# Patient Record
Sex: FEMALE | Race: WHITE | NOT HISPANIC OR LATINO | URBAN - METROPOLITAN AREA
[De-identification: names, ages, dates, MRNs, and addresses within clinical notes are randomized per-mention and may not be internally consistent; named-entity substitution may affect disease eponyms.]

---

## 2017-07-26 ENCOUNTER — COMMUNICATION - HEALTHEAST (OUTPATIENT)
Dept: BEHAVIORAL HEALTH | Facility: CLINIC | Age: 67
End: 2017-07-26

## 2017-07-26 DIAGNOSIS — F33.3 MAJOR DEPRESSIVE DISORDER, RECURRENT, SEVERE WITH PSYCHOTIC FEATURES (H): ICD-10-CM

## 2017-07-26 DIAGNOSIS — F42.9 OCD (OBSESSIVE COMPULSIVE DISORDER): ICD-10-CM

## 2017-07-26 DIAGNOSIS — G47.00 INSOMNIA: ICD-10-CM

## 2017-09-26 ENCOUNTER — AMBULATORY - HEALTHEAST (OUTPATIENT)
Dept: LAB | Facility: CLINIC | Age: 67
End: 2017-09-26

## 2017-09-26 ENCOUNTER — OFFICE VISIT - HEALTHEAST (OUTPATIENT)
Dept: BEHAVIORAL HEALTH | Facility: CLINIC | Age: 67
End: 2017-09-26

## 2017-09-26 DIAGNOSIS — G47.00 INSOMNIA: ICD-10-CM

## 2017-09-26 DIAGNOSIS — F09 COGNITIVE DISORDER: ICD-10-CM

## 2017-09-26 DIAGNOSIS — Z02.9 ENCOUNTERS FOR ADMINISTRATIVE PURPOSE: ICD-10-CM

## 2017-09-26 DIAGNOSIS — Z79.899 HIGH RISK MEDICATION USE: ICD-10-CM

## 2017-09-26 DIAGNOSIS — F42.9 OCD (OBSESSIVE COMPULSIVE DISORDER): ICD-10-CM

## 2017-09-26 DIAGNOSIS — R53.81 PHYSICAL DECONDITIONING: ICD-10-CM

## 2017-09-26 DIAGNOSIS — F45.9 SOMATOFORM DISORDER: ICD-10-CM

## 2017-09-26 ASSESSMENT — MIFFLIN-ST. JEOR: SCORE: 1087.88

## 2018-01-10 ENCOUNTER — COMMUNICATION - HEALTHEAST (OUTPATIENT)
Dept: BEHAVIORAL HEALTH | Facility: CLINIC | Age: 68
End: 2018-01-10

## 2018-03-13 ENCOUNTER — OFFICE VISIT - HEALTHEAST (OUTPATIENT)
Dept: BEHAVIORAL HEALTH | Facility: CLINIC | Age: 68
End: 2018-03-13

## 2018-03-13 DIAGNOSIS — R53.81 PHYSICAL DECONDITIONING: ICD-10-CM

## 2018-03-13 DIAGNOSIS — G47.00 INSOMNIA, UNSPECIFIED TYPE: ICD-10-CM

## 2018-03-13 DIAGNOSIS — F42.9 OCD (OBSESSIVE COMPULSIVE DISORDER): ICD-10-CM

## 2018-03-13 DIAGNOSIS — F09 COGNITIVE DISORDER: ICD-10-CM

## 2018-03-13 DIAGNOSIS — F33.3 MAJOR DEPRESSIVE DISORDER, RECURRENT, SEVERE WITH PSYCHOTIC FEATURES (H): ICD-10-CM

## 2018-03-13 DIAGNOSIS — Z02.9 ENCOUNTERS FOR ADMINISTRATIVE PURPOSE: ICD-10-CM

## 2018-03-13 DIAGNOSIS — F45.9 SOMATOFORM DISORDER: ICD-10-CM

## 2018-03-13 DIAGNOSIS — Z79.899 HIGH RISK MEDICATION USE: ICD-10-CM

## 2018-03-13 ASSESSMENT — MIFFLIN-ST. JEOR: SCORE: 1083.35

## 2018-03-14 ENCOUNTER — COMMUNICATION - HEALTHEAST (OUTPATIENT)
Dept: BEHAVIORAL HEALTH | Facility: CLINIC | Age: 68
End: 2018-03-14

## 2018-03-14 DIAGNOSIS — F42.9 OCD (OBSESSIVE COMPULSIVE DISORDER): ICD-10-CM

## 2018-09-11 ENCOUNTER — OFFICE VISIT - HEALTHEAST (OUTPATIENT)
Dept: BEHAVIORAL HEALTH | Facility: CLINIC | Age: 68
End: 2018-09-11

## 2018-09-11 DIAGNOSIS — Z79.899 HIGH RISK MEDICATION USE: ICD-10-CM

## 2018-09-11 DIAGNOSIS — F09 COGNITIVE DISORDER: ICD-10-CM

## 2018-09-11 DIAGNOSIS — F45.9 SOMATOFORM DISORDER: ICD-10-CM

## 2018-09-11 DIAGNOSIS — F41.1 GAD (GENERALIZED ANXIETY DISORDER): ICD-10-CM

## 2018-09-11 DIAGNOSIS — G47.00 INSOMNIA, UNSPECIFIED TYPE: ICD-10-CM

## 2018-09-11 DIAGNOSIS — R53.81 PHYSICAL DECONDITIONING: ICD-10-CM

## 2018-09-11 DIAGNOSIS — F42.9 OCD (OBSESSIVE COMPULSIVE DISORDER): ICD-10-CM

## 2018-09-11 ASSESSMENT — MIFFLIN-ST. JEOR: SCORE: 1106.03

## 2018-12-17 ENCOUNTER — COMMUNICATION - HEALTHEAST (OUTPATIENT)
Dept: BEHAVIORAL HEALTH | Facility: CLINIC | Age: 68
End: 2018-12-17

## 2018-12-17 DIAGNOSIS — F41.1 GAD (GENERALIZED ANXIETY DISORDER): ICD-10-CM

## 2018-12-17 DIAGNOSIS — F45.9 SOMATOFORM DISORDER: ICD-10-CM

## 2019-01-17 ENCOUNTER — OFFICE VISIT - HEALTHEAST (OUTPATIENT)
Dept: BEHAVIORAL HEALTH | Facility: CLINIC | Age: 69
End: 2019-01-17

## 2019-01-17 DIAGNOSIS — F45.9 SOMATOFORM DISORDER: ICD-10-CM

## 2019-01-17 DIAGNOSIS — F41.1 GAD (GENERALIZED ANXIETY DISORDER): ICD-10-CM

## 2019-01-17 DIAGNOSIS — Z79.899 HIGH RISK MEDICATION USE: ICD-10-CM

## 2019-01-17 DIAGNOSIS — F33.3 MAJOR DEPRESSIVE DISORDER, RECURRENT, SEVERE WITH PSYCHOTIC FEATURES (H): ICD-10-CM

## 2019-01-17 DIAGNOSIS — F09 COGNITIVE DISORDER: ICD-10-CM

## 2019-01-17 DIAGNOSIS — F42.9 OCD (OBSESSIVE COMPULSIVE DISORDER): ICD-10-CM

## 2019-01-17 RX ORDER — LISINOPRIL 5 MG/1
5 TABLET ORAL DAILY
Refills: 3 | Status: SHIPPED | COMMUNITY
Start: 2018-12-24

## 2019-01-17 ASSESSMENT — MIFFLIN-ST. JEOR: SCORE: 1115.1

## 2019-06-13 ENCOUNTER — OFFICE VISIT - HEALTHEAST (OUTPATIENT)
Dept: BEHAVIORAL HEALTH | Facility: CLINIC | Age: 69
End: 2019-06-13

## 2019-06-13 DIAGNOSIS — F09 COGNITIVE DISORDER: ICD-10-CM

## 2019-06-13 DIAGNOSIS — F42.9 OCD (OBSESSIVE COMPULSIVE DISORDER): ICD-10-CM

## 2019-06-13 DIAGNOSIS — F45.9 SOMATOFORM DISORDER: ICD-10-CM

## 2019-06-13 DIAGNOSIS — F33.3 MAJOR DEPRESSIVE DISORDER, RECURRENT, SEVERE WITH PSYCHOTIC FEATURES (H): ICD-10-CM

## 2019-06-13 DIAGNOSIS — Z79.899 HIGH RISK MEDICATION USE: ICD-10-CM

## 2019-06-13 DIAGNOSIS — F41.1 GAD (GENERALIZED ANXIETY DISORDER): ICD-10-CM

## 2019-06-13 ASSESSMENT — MIFFLIN-ST. JEOR: SCORE: 1060.67

## 2019-12-05 ENCOUNTER — OFFICE VISIT - HEALTHEAST (OUTPATIENT)
Dept: BEHAVIORAL HEALTH | Facility: CLINIC | Age: 69
End: 2019-12-05

## 2019-12-05 DIAGNOSIS — R53.81 PHYSICAL DECONDITIONING: ICD-10-CM

## 2019-12-05 DIAGNOSIS — Z79.899 HIGH RISK MEDICATION USE: ICD-10-CM

## 2019-12-05 DIAGNOSIS — F45.9 SOMATOFORM DISORDER: ICD-10-CM

## 2019-12-05 DIAGNOSIS — F42.9 OCD (OBSESSIVE COMPULSIVE DISORDER): ICD-10-CM

## 2019-12-05 DIAGNOSIS — F09 COGNITIVE DISORDER: ICD-10-CM

## 2019-12-05 DIAGNOSIS — F33.3 MAJOR DEPRESSIVE DISORDER, RECURRENT, SEVERE WITH PSYCHOTIC FEATURES (H): ICD-10-CM

## 2019-12-05 DIAGNOSIS — F41.1 GAD (GENERALIZED ANXIETY DISORDER): ICD-10-CM

## 2019-12-05 RX ORDER — POLYETHYLENE GLYCOL 3350 17 G/17G
POWDER, FOR SOLUTION ORAL DAILY PRN
Refills: 2 | Status: SHIPPED | COMMUNITY
Start: 2019-11-20

## 2019-12-05 ASSESSMENT — ANXIETY QUESTIONNAIRES
6. BECOMING EASILY ANNOYED OR IRRITABLE: NEARLY EVERY DAY
5. BEING SO RESTLESS THAT IT IS HARD TO SIT STILL: NOT AT ALL
7. FEELING AFRAID AS IF SOMETHING AWFUL MIGHT HAPPEN: SEVERAL DAYS
4. TROUBLE RELAXING: NEARLY EVERY DAY
2. NOT BEING ABLE TO STOP OR CONTROL WORRYING: NEARLY EVERY DAY
3. WORRYING TOO MUCH ABOUT DIFFERENT THINGS: NEARLY EVERY DAY
GAD7 TOTAL SCORE: 16
1. FEELING NERVOUS, ANXIOUS, OR ON EDGE: NEARLY EVERY DAY

## 2019-12-05 ASSESSMENT — PATIENT HEALTH QUESTIONNAIRE - PHQ9: SUM OF ALL RESPONSES TO PHQ QUESTIONS 1-9: 11

## 2019-12-05 ASSESSMENT — MIFFLIN-ST. JEOR: SCORE: 1151.39

## 2020-05-28 ENCOUNTER — COMMUNICATION - HEALTHEAST (OUTPATIENT)
Dept: BEHAVIORAL HEALTH | Facility: CLINIC | Age: 70
End: 2020-05-28

## 2020-05-28 DIAGNOSIS — F42.9 OCD (OBSESSIVE COMPULSIVE DISORDER): ICD-10-CM

## 2020-05-28 DIAGNOSIS — F45.9 SOMATOFORM DISORDER: ICD-10-CM

## 2020-05-28 DIAGNOSIS — Z79.899 HIGH RISK MEDICATION USE: ICD-10-CM

## 2020-05-28 DIAGNOSIS — F09 COGNITIVE DISORDER: ICD-10-CM

## 2020-05-28 DIAGNOSIS — F33.3 MAJOR DEPRESSIVE DISORDER, RECURRENT, SEVERE WITH PSYCHOTIC FEATURES (H): ICD-10-CM

## 2020-05-28 DIAGNOSIS — F41.1 GAD (GENERALIZED ANXIETY DISORDER): ICD-10-CM

## 2020-06-04 ENCOUNTER — COMMUNICATION - HEALTHEAST (OUTPATIENT)
Dept: BEHAVIORAL HEALTH | Facility: CLINIC | Age: 70
End: 2020-06-04

## 2020-06-18 ENCOUNTER — COMMUNICATION - HEALTHEAST (OUTPATIENT)
Dept: BEHAVIORAL HEALTH | Facility: CLINIC | Age: 70
End: 2020-06-18

## 2020-06-18 DIAGNOSIS — F42.9 OCD (OBSESSIVE COMPULSIVE DISORDER): ICD-10-CM

## 2020-06-18 DIAGNOSIS — F33.3 MAJOR DEPRESSIVE DISORDER, RECURRENT, SEVERE WITH PSYCHOTIC FEATURES (H): ICD-10-CM

## 2020-06-18 DIAGNOSIS — F09 COGNITIVE DISORDER: ICD-10-CM

## 2020-06-18 DIAGNOSIS — F41.1 GAD (GENERALIZED ANXIETY DISORDER): ICD-10-CM

## 2020-06-18 DIAGNOSIS — F45.9 SOMATOFORM DISORDER: ICD-10-CM

## 2020-06-18 DIAGNOSIS — Z79.899 HIGH RISK MEDICATION USE: ICD-10-CM

## 2020-07-07 ENCOUNTER — OFFICE VISIT - HEALTHEAST (OUTPATIENT)
Dept: BEHAVIORAL HEALTH | Facility: CLINIC | Age: 70
End: 2020-07-07

## 2020-07-07 DIAGNOSIS — F45.9 SOMATOFORM DISORDER: ICD-10-CM

## 2020-07-07 DIAGNOSIS — Z79.899 HIGH RISK MEDICATION USE: ICD-10-CM

## 2020-07-07 DIAGNOSIS — F09 COGNITIVE DISORDER: ICD-10-CM

## 2020-07-07 DIAGNOSIS — F33.3 MAJOR DEPRESSIVE DISORDER, RECURRENT, SEVERE WITH PSYCHOTIC FEATURES (H): ICD-10-CM

## 2020-07-07 DIAGNOSIS — F41.1 GAD (GENERALIZED ANXIETY DISORDER): ICD-10-CM

## 2020-07-07 DIAGNOSIS — F42.9 OCD (OBSESSIVE COMPULSIVE DISORDER): ICD-10-CM

## 2020-07-07 DIAGNOSIS — R25.2 MUSCLE CRAMPS: ICD-10-CM

## 2020-07-07 RX ORDER — SERTRALINE HYDROCHLORIDE 100 MG/1
150 TABLET, FILM COATED ORAL DAILY
Qty: 45 TABLET | Refills: 5 | Status: SHIPPED | OUTPATIENT
Start: 2020-07-07

## 2020-07-07 RX ORDER — OMEPRAZOLE 40 MG/1
40 CAPSULE, DELAYED RELEASE ORAL
Status: SHIPPED | COMMUNITY
Start: 2020-07-07

## 2020-07-07 RX ORDER — QUETIAPINE FUMARATE 100 MG/1
150 TABLET, FILM COATED ORAL AT BEDTIME
Qty: 45 TABLET | Refills: 5 | Status: SHIPPED | OUTPATIENT
Start: 2020-07-07

## 2020-07-07 RX ORDER — BACLOFEN 20 MG
500 TABLET ORAL AT BEDTIME
Qty: 30 EACH | Refills: 5 | Status: SHIPPED | COMMUNITY
Start: 2020-07-07

## 2020-09-15 ENCOUNTER — COMMUNICATION - HEALTHEAST (OUTPATIENT)
Dept: BEHAVIORAL HEALTH | Facility: CLINIC | Age: 70
End: 2020-09-15

## 2020-11-05 ENCOUNTER — RECORDS - HEALTHEAST (OUTPATIENT)
Dept: ADMINISTRATIVE | Facility: OTHER | Age: 70
End: 2020-11-05

## 2020-11-17 ENCOUNTER — COMMUNICATION - HEALTHEAST (OUTPATIENT)
Dept: BEHAVIORAL HEALTH | Facility: CLINIC | Age: 70
End: 2020-11-17

## 2021-04-11 ENCOUNTER — COMMUNICATION - HEALTHEAST (OUTPATIENT)
Dept: BEHAVIORAL HEALTH | Facility: CLINIC | Age: 71
End: 2021-04-11

## 2021-04-11 DIAGNOSIS — R25.2 MUSCLE CRAMPS: ICD-10-CM

## 2021-05-26 ASSESSMENT — PATIENT HEALTH QUESTIONNAIRE - PHQ9: SUM OF ALL RESPONSES TO PHQ QUESTIONS 1-9: 11

## 2021-05-28 ASSESSMENT — ANXIETY QUESTIONNAIRES: GAD7 TOTAL SCORE: 16

## 2021-05-29 NOTE — PROGRESS NOTES
Pt is here for culturally sensitive psychiatric med management follow up. Client is complaining of physical pain in multiple areas: legs due to varicose vein,  back pain, and abdominal pain. Said she had to cancel colonoscopy - was unable to finish preparation; had body rash after taking Miralax, reports having reaction to hair dye as well.   Reports depressive mood, has no motivation to do things, struggling with OCD behavior and social isolation due to mental illness, has been refusing oral temp.  Pt is following up with multiple doctors and doing PT.     Correct pharmacy verified with patient and confirmed in snapshot? [x] yes []no    Charge captured ? [x] yes  [] no    Medications Phoned  to Pharmacy [] yes [x]no  Name of Pharmacist:  List Medications, including dose, quantity and instructions      Medication Prescriptions given to patient   [] yes  [x] no   List the name of the drug the prescription was written for.       Medications ordered this visit were e-scribed.  Verified by order class [x] yes  [] no   Seroquel and Zoloft  Medication changes or discontinuations were communicated to patient's pharmacy: [] yes  [x] no    UA collected [] yes  [x] no    [place] Prescription Monitoring Program Reviewed? [] yes  [x] no    Referrals were made to:  none    Future appointment was made: [x] yes  [] no  12/5/19  Dictation completed at time of chart check: [x] yes  [] no    I have checked the documentation for today s encounters and the above information has been reviewed and completed.

## 2021-05-29 NOTE — PROGRESS NOTES
OUTPATIENT PROGRESS NOTE      Date of visit June 13, 2019         Reasons For Visit Are Multiple Today:   1.  Culturally sensitive follow-up for mental health issues  2., follow-up for depression: Stable, improved  3.  Follow-up obsessive-compulsive disorderchronic handwashing, stable, no worsening   4.  Follow-up on insomnia: Improved  5.  Follow-up on paranoia and ideas of reference: Resolved  6.  High risk medication use: Reviewed, discussed test was completed, score 0  Follow-up on inventories: JESI 7 score 17 PHQ 9 score 15  8.  Education on treatment options: I suggested to titrate    Zoloft for residual depression, but the patient declines at this time, says that she is quite comfortable at her current level  10 [place] cultural issues: The interview is in the [place] language  11.  Education on future follow-up appointments.    History of present illness/Subjective:  The patient is [place] speaking. The interview is conducted in [place] language, translated personally by me into English records which adds additional element of complexity to this visit and my assessment.  Review of symptoms as above.  She reports normal sleep except for occasional nightmares.  She canceled colonoscopy yesterday as she needed additional cleansing.  It is rescheduled for next week.  She is seeing chiropractor and physical therapist and psychotherapist weekly.  Spinal pain consultation for steroidal injection is pending.          Medications:      Current Outpatient Medications   Medication Sig Dispense Refill     ACETAMINOPHEN (TYLENOL ORAL) Take by mouth as needed.       esomeprazole (NEXIUM) 40 MG capsule TAKE 1 CAPSULE BY MOUTH DAILY  11     lisinopril (PRINIVIL,ZESTRIL) 5 MG tablet Take 5 mg by mouth daily.  3     miscellaneous medical supply Misc 1 Each by Misc.(Non-Drug; Combo Route) route Once Daily. Back support belt       NON FORMULARY No - Shpa; Allohol             QUEtiapine (SEROQUEL) 100 MG tablet Take 1 tablet (100  "mg total) by mouth at bedtime. 30 tablet 5     sertraline (ZOLOFT) 100 MG tablet Take 1 tablet (100 mg total) by mouth daily. 30 tablet 5     L.acid/L.casei/B.bif/B.oliver/FOS (PROBIOTIC BLEND ORAL) Take by mouth.       MAGNESIUM OXIDE ORAL Take by mouth bedtime.       No current facility-administered medications for this visit.          Family history/Social history  The patient lives alone.  She is not a drug or alcohol user.          Procedures: Complex visit as multiple issues were addressed, total of 11 .  Total time today is 15 minutes, face to face and direct hands on patient's care in the clinic, more than 50% of time was coordination of care.  [place] cultural issues, high risk medication use, discus test renewal orders review of nursing report, other issues as below.  1. Coordination of care: nursing notes, vital signs,  communication with the pharmacy, and  medication orders were reviewed signed and discussed with the patient. Multiple chart entries were reviewed in preparation of documentation and generation of documentation.  2.  Education: was provided on diagnosis, medication regimen, psychotherapeutic treatment modalities, future follow-up appointments.  3.  Counseling: was provided on coping with mental illness.  4.  Coordination of care: I personally coordinated all medication orders, follow up orders, pharmacy requests, and provided the patient with detailed instructions in [place] language  5. [place] cultural and immigration issues were addressed, the interview was conducted in [place] language   6. Coordination of care: I personally contacted referring provider Dr. Donohue with update on patient's status, records were supplied as well.    Review Of Systems:  As above.   The reminder of 10 systems was negative.      Vital Signs:    /88 (Patient Site: Left Arm, Patient Position: Sitting, Cuff Size: Adult Regular)   Pulse 66   Ht 4' 11\" (1.499 m)   Wt 140 lb (63.5 kg) Comment: per pt  BMI " 28.28 kg/m       Mental Status Examination:     Appearance   nicely groomed today.  Alert and oriented x3 attention and concentration are normal speech fluent mood described as better affect restricted at baseline thought processing concrete ruminative associations are ruminative thought content with somatic focus language normal psychomotor activity normal gait and station without abnormalities insight and judgment are fair.   Laboratory Data:     personally reviewed.   No visits with results within 2 Month(s) from this visit.   Latest known visit with results is:   Lab on 05/18/2016   Component Date Value     Glucose 05/18/2016 73      Patient Fasting > 8hrs? 05/18/2016 Yes      Hemoglobin A1c 05/18/2016 5.7      Triglycerides 05/18/2016 76      Cholesterol 05/18/2016 224*     LDL Calculated 05/18/2016 133*     HDL Cholesterol 05/18/2016 76      Patient Fasting > 8hrs? 05/18/2016 Yes          Diagnosis:  No past medical history on file.    Patient Active Problem List   Diagnosis     Major depressive disorder, recurrent episode, severe, specified as with psychotic behavior     Other dysfunctions of sleep stages or arousal from sleep     Anxiety     OCD (obsessive compulsive disorder)     Somatoform disorder     Neuroleptic consent signed 1/21/16   Visit diagnosis: Major depression disorder, recurrent, in partial remission.  OCD.  Generalized anxiety disorder.  Cognitive disorder.  Somatoform disorder.  High risk medication use.         Treatment Plan:  1. The patient was provided with education and detailed written and verbal instructions in native language on diagnosis, future follow-up, and treatment plan, same instructions were also provided in English language.   2. Instructed to return to clinic in 6 months or sooner if needed.  3. Nurse only clinic is available in the interim if needed.  4. Crisis plan in place.  5. Referral to a culturally sensitive Russian speaking therapist Dr. James was provided.  6.  No  changes in psychotropics.  Will need to renew her last prescription for Seroquel 100 mg nightly Zoloft 100 mg every morning.            This note was created by undersigned using a Dragon dictation system. All typing errors or contextual distortion are unintentional and software inherent.     Megan Norman MD

## 2021-05-31 VITALS — HEIGHT: 59 IN | WEIGHT: 146 LBS | BODY MASS INDEX: 29.43 KG/M2

## 2021-06-01 ENCOUNTER — RECORDS - HEALTHEAST (OUTPATIENT)
Dept: ADMINISTRATIVE | Facility: CLINIC | Age: 71
End: 2021-06-01

## 2021-06-01 VITALS — HEIGHT: 59 IN | WEIGHT: 145 LBS | BODY MASS INDEX: 29.23 KG/M2

## 2021-06-02 VITALS — HEIGHT: 59 IN | BODY MASS INDEX: 30.24 KG/M2 | WEIGHT: 150 LBS

## 2021-06-02 VITALS — BODY MASS INDEX: 30.64 KG/M2 | HEIGHT: 59 IN | WEIGHT: 152 LBS

## 2021-06-03 VITALS — BODY MASS INDEX: 28.22 KG/M2 | HEIGHT: 59 IN | WEIGHT: 140 LBS

## 2021-06-04 VITALS
HEIGHT: 59 IN | SYSTOLIC BLOOD PRESSURE: 148 MMHG | DIASTOLIC BLOOD PRESSURE: 90 MMHG | HEART RATE: 72 BPM | WEIGHT: 160 LBS | BODY MASS INDEX: 32.25 KG/M2

## 2021-06-04 NOTE — PROGRESS NOTES
Correct pharmacy verified with patient and confirmed in snapshot? [x] yes []no    Charge captured ? [x] yes  [] no    Medications Phoned  to Pharmacy [] yes [x]no  Name of Pharmacist:  List Medications, including dose, quantity and instructions      Medication Prescriptions given to patient   [] yes  [x] no   List the name of the drug the prescription was written for.       Medications ordered this visit were e-scribed.  Verified by order class [x] yes  [] no   Seroquel 100 mg; Zoloft 100 mg  Medication changes or discontinuations were communicated to patient's pharmacy: [] yes  [x] no    UA collected [] yes  [x] no    [place] Prescription Monitoring Program Reviewed? [] yes  [x] no    Referrals were made to:None      Future appointment was made: [x] yes  [] no   6/04/2020  Dictation completed at time of chart check: [x] yes  [] no    I have checked the documentation for today s encounters and the above information has been reviewed and completed.

## 2021-06-04 NOTE — PROGRESS NOTES
OUTPATIENT PROGRESS NOTE      Date of visit December 5, 2019     There have not been many changes since last visit on 6/13/19 in chief complaint, reasons for visit, subjective, objective, nursing report, coordination of care, procedures, mental status examination, visit diagnosis, and treatment plan, so the note was partially copied from that date.    Reasons For Visit Are Multiple Today:   1.  Culturally sensitive follow-up for mental health issues  2., follow-up for depression: Stable, improved  3.  Follow-up obsessive-compulsive disorder - chronic handwashing, fear of contaminations, refusal to touch door handles, stable, no worsening   4.  Follow-up on insomnia: Improved  5.  Follow-up on paranoia and ideas of reference: none today  6.  Somatization: the patient is again quite focused somatically, talking at length about her scoliosis, and it possibly affecting her GI symptoms because she is not active physically, because it is too painful due to back pain, she reflects at length on her current chiropractic interventions, recent xray of the back  7.  Review of psychiatric symptoms: continues to report anxiety, low mood, fears that something is wrong with her health, she is tangential, ruminative,   8.  High risk medication use: Reviewed, discussed test was completed, score 0  9. Follow-up on inventories: JESI 7 score 16 PHQ 9 score 11  10 [place] cultural issues: The interview is in the [place] language  11.  Education on future follow-up appointments.  12. Education on treatment options: I suggested to titrate    Zoloft for residual depression, but the patient declines at this time, says that she is quite comfortable at her current level, and that she is afraid to take higher doses - which is a common notion in [place] speaking community - but eventually agreed to titrate Zoloft after extensive explanation that with improvement of symptoms of depression OCD and anxiety she can expect improved quality of life    13.  New complaint of memory problem  14.  Education on dementia work-up and neuropsychiatric testing      History of present illness/Subjective:  The patient is Citizen of the [place] speaking. The interview is conducted in Citizen of the [place] language, translated personally by me into English records which adds additional element of complexity to this visit and my assessment.  Review of symptoms as above.  She reports normal sleep except for occasional nightmares.   She is seeing chiropractor and physical therapist and psychotherapist weekly.           Medications:      Current Outpatient Medications   Medication Sig Dispense Refill     ACETAMINOPHEN (TYLENOL ORAL) Take by mouth as needed.       b complex vitamins tablet Take 1 tablet by mouth daily.       cholecalciferol, vitamin D3, (VITAMIN D3) 2,000 unit capsule Take 1,000 Units by mouth daily.       esomeprazole (NEXIUM) 40 MG capsule TAKE 1 CAPSULE BY MOUTH DAILY  11     L.acid/L.casei/B.bif/B.oliver/FOS (PROBIOTIC BLEND ORAL) Take by mouth.       lisinopril (PRINIVIL,ZESTRIL) 5 MG tablet Take 5 mg by mouth daily.  3     MAGNESIUM OXIDE ORAL Take by mouth bedtime.       miscellaneous medical supply Misc 1 Each by Misc.(Non-Drug; Combo Route) route Once Daily. Back support belt       NON FORMULARY No - Shpa; Allohol             polyethylene glycol (GLYCOLAX) 17 gram/dose powder   2     QUEtiapine (SEROQUEL) 100 MG tablet Take 1 tablet (100 mg total) by mouth at bedtime. 30 tablet 5     sertraline (ZOLOFT) 100 MG tablet Take 1 tablet (100 mg total) by mouth daily. 30 tablet 5     No current facility-administered medications for this visit.          Family history/Social history  The patient lives alone.  She is not a drug or alcohol user.          Procedures: Complex visit as multiple issues were addressed, total of 14 .  Total time today is  40 minutes, face to face and direct hands on patient's care in the clinic, more than 50% of time was coordination of care.  Citizen of the [place] cultural  "issues, high risk medication use, discus test, new complaints, adjustment of medications, high risk medication use, renewal orders, extensive dementia work-up, review of nursing report, requiring regular labs and discus testing, other issues as below.  1. Coordination of care: nursing notes, vital signs,  communication with the pharmacy, and  medication orders were reviewed signed and discussed with the patient. Multiple chart entries were reviewed in preparation of documentation and generation of documentation.  2.  Education: was provided on diagnosis, medication regimen, psychotherapeutic treatment modalities, future follow-up appointments.  3.  Counseling: was provided on coping with mental illness.  4.  Coordination of care: I personally coordinated all medication orders, follow up orders, pharmacy requests, and provided the patient with detailed instructions in [place] language  5. [place] cultural and immigration issues were addressed, the interview was conducted in [place] language   6. Coordination of care: I personally contacted referring provider Dr. Donohue with update on patient's status, records were supplied as well.    Review Of Systems:  As above.   The reminder of 10 systems was negative.      Vital Signs:    /90 (Patient Site: Left Arm, Patient Position: Sitting, Cuff Size: Adult Regular) Comment: pt reproted these numbers from her morning BP at home  Pulse 72   Ht 4' 11\" (1.499 m)   Wt 160 lb (72.6 kg)   BMI 32.32 kg/m      Mental Status Examination:     Appearance   adequately groomed today.  Alert and oriented x3, attention and concentration are normal, speech fluent, mood described as anxious, affect restricted at baseline, thought processing concrete ruminative, associations are ruminative, thought content with somatic, ,language normal, psychomotor activity normal, gait and station without abnormalities, short-term memory with forgetfulness, no gross abnormalities of long-term memory, " insight and judgment are fair.   Laboratory Data:     personally reviewed.   No visits with results within 2 Month(s) from this visit.   Latest known visit with results is:   Lab on 05/18/2016   Component Date Value     Glucose 05/18/2016 73      Patient Fasting > 8hrs? 05/18/2016 Yes      Hemoglobin A1c 05/18/2016 5.7      Triglycerides 05/18/2016 76      Cholesterol 05/18/2016 224*     LDL Calculated 05/18/2016 133*     HDL Cholesterol 05/18/2016 76      Patient Fasting > 8hrs? 05/18/2016 Yes          Diagnosis:  No past medical history on file.    Patient Active Problem List   Diagnosis     Major depressive disorder, recurrent episode, severe, specified as with psychotic behavior     Other dysfunctions of sleep stages or arousal from sleep     Anxiety     OCD (obsessive compulsive disorder)     Somatoform disorder     Neuroleptic consent signed 1/21/16       Visit diagnosis: Major depression disorder, recurrent.  OCD.  Generalized anxiety disorder.  Cognitive disorder.  Somatoform disorder.  High risk medication use.         Treatment Plan:  1. The patient was provided with education and detailed written and verbal instructions in native language on diagnosis, future follow-up, and treatment plan, same instructions were also provided in English language.   2. Instructed to return to clinic in 6 months or sooner if needed.  3. Nurse only clinic is available in the interim if needed.  4. Crisis plan in place.  5. Referral to a culturally sensitive Russian speaking therapist Dr. James was provided.  6.  Continue Seroquel without change  7.  Titrate Zoloft 150 mg daily for depression, anxiety, OCD.  8.  We will order dementia work-up.  B12 folate RPR heavy metal screen and TSH.  9.  We will order MRI scan of the head to rule out structural lesion contributing to cognitive deficits  10.  Neuropsychiatric testing for cognitive deficits/dementia  11.  Fasting lipid profile, fasting glucose level, and hemoglobin A1c due  to the risk of metabolic syndrome with Seroquel            This note was created by undersigned using a Dragon dictation system. All typing errors or contextual distortion are unintentional and software inherent.     Megan Norman MD

## 2021-06-04 NOTE — PROGRESS NOTES
Pt is here for culturally sensitive psychiatric medication management follow up.  Flaco Leyva  services provided today.  Due OCD- pt refuses temperature check today.  Discus performed today Score: 0 ; due 6/2020    Pt does report itching and rash one her head and plan to check with primary.  PHQ-9 score 11  JESI-7 score 16     [place] :  Unable to verify activity at time of visit.

## 2021-06-09 NOTE — TELEPHONE ENCOUNTER
Date of Last Office Visit: 12/5/19  Date of Next Office Visit: 7/7/20  No shows since last visit: 6/4/20  Cancellations since last visit: 0  ED visits since last visit:  0    Medication quetiapine 100 mg date last ordered: 12/5/19  Qty: 30  Refills: 5    Lapse in therapy greater than 7 days: No  Medication refill request verified as identical to current order: Yes  Result of Last DAM, VPA, Li+ Level, CBC, or Carbamazepine Level (at or since last visit): N/A     [] Medication refilled per [facility] M-1.   [x] Medication unable to be refilled by RN due to criteria not met as indicated below:     []Eligibility - not seen in last year    []Supervision - no future appointment    [x]Compliance - no show     []Verification - order discrepancy    []Controlled Medication    []Medication not included in RN Protocol    []90 - day supply request    [x]Other - No visits within last 6 months     Current Medication list:  Lourdes Miller    (MRN [id number])   Your Current Medications Are     ACETAMINOPHEN (TYLENOL ORAL)  Take by mouth as needed.    b complex vitamins tablet  Take 1 tablet by mouth daily.    cholecalciferol, vitamin D3, (VITAMIN D3) 2,000 unit capsule  Take 1,000 Units by mouth daily.    esomeprazole (NEXIUM) 40 MG capsule  TAKE 1 CAPSULE BY MOUTH DAILY    L.acid/L.casei/B.bif/B.oliver/FOS (PROBIOTIC BLEND ORAL)  Take by mouth.    lisinopril (PRINIVIL,ZESTRIL) 5 MG tablet  Take 5 mg by mouth daily.    MAGNESIUM OXIDE ORAL  Take by mouth bedtime.    miscellaneous medical supply Misc  1 Each by Misc.(Non-Drug; Combo Route) route Once Daily. Back support belt    NON FORMULARY  No - Shpa; Allohol        polyethylene glycol (GLYCOLAX) 17 gram/dose powder     QUEtiapine (SEROQUEL) 100 MG tablet  Take 1 tablet (100 mg total) by mouth at bedtime.    sertraline (ZOLOFT) 100 MG tablet  TAKE 1.5 TABLETS (150 MG TOTAL) BY MOUTH DAILY.        Medication Plan of Care at last office visit with MD/CNP:  Treatment Plan:  1. The patient was  provided with education and detailed written and verbal instructions in native language on diagnosis, future follow-up, and treatment plan, same instructions were also provided in English language.   2. Instructed to return to clinic in 6 months or sooner if needed.  3. Nurse only clinic is available in the interim if needed.  4. Crisis plan in place.  5. Referral to a culturally sensitive Russian speaking therapist Dr. James was provided.  6.  Continue Seroquel without change  7.  Titrate Zoloft 150 mg daily for depression, anxiety, OCD.  8.  We will order dementia work-up.  B12 folate RPR heavy metal screen and TSH.  9.  We will order MRI scan of the head to rule out structural lesion contributing to cognitive deficits  10.  Neuropsychiatric testing for cognitive deficits/dementia  11.  Fasting lipid profile, fasting glucose level, and hemoglobin A1c due to the risk of metabolic syndrome with Seroquel

## 2021-06-09 NOTE — PROGRESS NOTES
This video/telephone visit will be conducted via a call between you and your physician/provider. We have found that certain health care needs can be provided without the need for an in-person physical exam. This service lets us provide the care you need with a video /telephone conversation. If a prescription is necessary we can send it directly to your pharmacy. If lab work is needed we can place an order for that and you can then stop by our lab to have the test done at a later time.    Just as we bill insurance for in-person visits, we also bill insurance for video/telephone visits. If you have questions about your insurance coverage, we recommend that you speak with your insurance company.    Patient has given verbal consent for video/Telephone visit? AD  Patient would like the video visit invitation sent by: Text to cell phone: EMILY or Send to email: EMILY  CMA/LPN: AD,LPN  Pt is complaining of decline in short term memory - brain MRI scheduled for 7/20/20  Patient verified allergies, medications and pharmacy via phone.  Patient states she is ready for visit.    ________________________________________  Medications Phoned  to Pharmacy [x] yes []no  Name of Pharmacist:Keren  List Medications, including dose, quantity and instructions  Magnesium Oxide 500 mg Q HS Qty: 30 Rx: 5  Medications ordered this visit were e-scribed.  Verified by order class [x] yes  [] no  Zoloft and Seroquel 150 mg    Medication changes or discontinuations were communicated to patient's pharmacy: [x] yes  [] no  Called [facility] and d/c'd Seroquel 100 mg Q HS due to dose increase  Dictation completed at time of chart check: [x] yes  [] no  AVS mailed to pt  I have checked the documentation for today s encounters and the above information has been reviewed and completed.
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
## 2021-06-11 NOTE — TELEPHONE ENCOUNTER
LM for pt updating that we are cancelling appt on 11/5/20 due to  Dr. Norman's  unexpected SAI for 3 month . We are asking patients to contact their PCP for a bridge supply of medications. Advised the pt to call back if any problems and updating that we will be contacting her to set up a follow up appt with Dr. Norman when available.

## 2021-06-13 NOTE — PROGRESS NOTES
OUTPATIENT PROGRESS NOTE      Date of visit September 26, 2017        Reasons For Visit Are Multiple Today:   1.  Depression, improved overall since Seroquel was started  2.  Anxiety, improved overall with Seroquel  3.  Insomnia, improved with Seroquel  4.  Tearfulness, mostly during sensitive moments in the form of emotional incontinence  5.  Multiple hand washing, will not touch the door knob without the napkin, uses multiple Clorox Kleenex during day every day, says that she is embarrassed to visit people or to have visitors over, because of her preoccupation with contamination and need to wash everything thoroughly multiple times , no changes in severity  6.  Multiple pain and health complains, mostly aches and pains, heartburn, heaviness in the syomach, episodic rushes and itching usually self resolving or with OTC, also followed by PMD Dr. Soares and [facility], and wants to return to life medical culturally sensitive [place] speaking clinic  7.  Suicidal thoughts, resolved  8.  [place] cultural issues  9.  Education on future follow up appointment  10. Questions about medications  11. Review of potential side effects  12. Ego activities: avoiding friends due to obsessive washing , also limited due to apathy and lack of interest. Recently cam back from [place] where she visited her son  13. High risk medications use: Seroquel  14.  Discus test: score 0 normal  15. New onset forgetfullness    Subjective:  The patient is [place]ian speaking. The interview is conducted in [place] language, translated personally by me into English records which adds additional element of complexity to this visit and assessment. Complains are as above.   Multiple issues were addressed and multiple questions were answered. Occasionally misperceives the environment and hears somebody taling in the hallway, but there is no one there when she checks on it.  She does not want to titrate SSRI for OCD, says she is very comfortable with her med  regimen, and does not want to mess with it. Says she only takes Zoloft and Seroquel.     Procedures:  1. Coordination of care: nursing notes, vital signs, multiple records of communication between the patient and the clinic, communication with the pharmacy, and multiple medication orders were reviewed signed and discussed with the patient.  Multiple chart and Epic entries were reviewed in preparation and generation of visit related documentation, multiple visit pertinent chart and EPIC orders were entered.   multiple communication of the patient with the clinic, multiple requests from her apartment management to complete paperwork recommending a quieter apartment.    2.  Education: was provided on importance of compliance, medication side effects, diagnosis, and the importance of ego activities, goal oriented productive activities during day and nurturing activities for treatment of depression. diagnosis and treatment recommendations were explicitly discussed.  Safety issues were also discussed.  Life style modifications and healthy eating habits for weight control.  3.  Counseling: was provided on coping with depression and pain  4.  Coordination of care: I personally coordinated all medication orders, multiple labs and follow up orders, and provided the patient with detailed instructions in her native [place] and English  languages  7. [place] cultural and immigration issues were addressed, as it is common in Northern [place] speaking community to take Russian produced medications which could be  Dangerous. The patient takes the above medications.  This regimen seems to be safe to combine with Zoloft and Seroquel.  8. Coordination of care: I personally contacted referring provider Dr. Donohue with update on patient's status, records were supplied as well.      Review Of Systems:  As above    Mental Status Examination:   The patient is adequately groomed.  Dressed nicely. She is not tearful.  She is much less somaticaly  preoccupied compare to previous visits, however remains quite somatically focused. .  Restricted affect.  She is  directable and logical.  No suicidal thoughts.  No thoughts of harming others.  Ideas of reference/paranoia and delusions  Are resolved. She is quite hyperverbal but no other symptoms of margareth, just some disinhibition. No gross abnormalities with STM LTM but subjectively complains that both are impaired. [place] and superficial way of conceptualizing.    Limited Physical Examination:  Was performed : Due to complaints of pain, physical discomfort.  The patient is stable on her feet.  Clear skin.  Normal breathing.    Laboratory Data:    personally reviewed.   No visits with results within 2 Month(s) from this visit.  Latest known visit with results is:    Lab on 05/18/2016   Component Date Value     Glucose 05/18/2016 73      Patient Fasting > 8hrs? 05/18/2016 Yes      Hemoglobin A1c 05/18/2016 5.7      Triglycerides 05/18/2016 76      Cholesterol 05/18/2016 224*     LDL Calculated 05/18/2016 133*     HDL Cholesterol 05/18/2016 76      Patient Fasting > 8hrs? 05/18/2016 Yes          Diagnosis:  1.  Major depression, recurrent, moderate, with psychotic features, remissed  2.  Insomnia, improved  3.  Anxiety disorder NOS, improved  4.  OCD.  5. Somatoform disorder, stable  6. Cognitive disorder NOS - possibly in the context of residual mood symptoms, suboptimally controlled OCD, though possibility of organic causes can not be ruled out    Treatment Plan:  1. The patient was provided with education and detailed instructions in native language on diagnosis and treatment plan.   2. Instructed to return to clinic in 3 - 4 months.  We discussed that she needs regular scheduled frequent appointments with primary physician, nurse, PT, and myself, for treatment of somatoform disorder.  She says that she does not want to come for appointments regularly, because she is very stressed out prior to appointments,  anxious, unable to sleep, so appointments  are contra-therapeutic for her in her opinion. So follow up in 6 months  3. Continue PT for exercise and physical strengthening, culturally sensitive [place] speaking [facility] Dr. Donohue  4. Crisis plan in place.  5. Referral to a culturally sensitive Russian speaking therapist Dr. James was provided.  6. Continue   Zoloft for OCD and depression 50 mg every morning, and Seroquel for insomnia, psychosis, and augmentation purposes   7.  Follow up with PMD ; frequently scheduled monthly visit for somatoform disorder  8.  Neuropsychiatric testing for cognitive disorder. She is not sure if she is going to follow up with it  9. If abnormal neuropsych test, will pursue a more detailed work up, for now will only order TSH as  thyroid abnormality in this age group is the most common factor contributing to cognitive issues    Total time is 40 minutes, more than 50% of time was coordination of care, education, counseling,  [place] cultural issues, multiple chart and Epic entries were reviewed in preparation and generation of visit related documentation, multiple visit pertinent chart and EPIC orders were entered,  as fully reflected in the procedures paragraph. Please see associated nursing records for other details.      Megan Norman MD

## 2021-06-13 NOTE — TELEPHONE ENCOUNTER
Left VM updating the pt that Dr. Norman will be out until end of January and asking to call us and set up a future appt. Advised to contact clinic sooner if in need of additional care or if any concerns and reach out to PCP for med refills if possible.

## 2021-06-13 NOTE — PROGRESS NOTES
Correct pharmacy verified with patient and confirmed in snapshot? [x] yes []no    Medications Phoned  to Pharmacy [] yes [x]no  Name of Pharmacist:  List Medications, including dose, quantity and instructions      Medication Prescriptions given to patient   [] yes  [x] no   List the name of the drug the prescription was written for.      Medications ordered this visit were e-scribed.  Verified by order class [x] yes  [] no  Seroquel, Zoloft    Medication changes or discontinuations were communicated to patient's pharmacy:  [] yes  [x] no  Pharmacist Spoke With:     UA collected [] yes  [x] no    [place] Prescription Monitoring Program Reviewed? [x] yes  [] no    Referrals/Labs were made to: Neuropsychological Testing    Completed Charge Capture?  [x] yes  [] no    Future appointment was made: [x] yes  [] no  3/20/18  Dictation completed at time of chart check: [x] yes  [] no    I have checked the documentation for today s encounters and the above information has been reviewed and completed.

## 2021-06-16 NOTE — PROGRESS NOTES
Pt is here for culturally sensitive psychiatric med management follow up.   Mood: low, depressed  Pt is complaining of ongoing abdominal pain and low appetite due to pain. Client is following up with endocrinology, was prescribed Nexium and was referred to endoscopy.  OCD: increased. Pt refused Vitals today, she is uncomfortable to leave her house; cleaning and washing a lot  Her energy level has been low as well. Client reports being compliant with Seroquel and Sertraline.      [facility] [place] Date: 18  Query Report Page#: 1  Patient Rx History Report  EDER LOW  Search Criteria: Last Name 'Eder' and First Name 'Lourdes' and  = ' and Request Period =  to  ' - 1 out of 1 Recipients Selected.  *N/R N=New R=Refill  +MED Daily  Patients that match search criteria  ----------------------------------------------------------------------------------------------------------------------------------  [id number] EDER LOW,  50; [address] , [facility] [address]  **Per CDC guidance, the conversion factors and associated daily morphine milligram equivalents for drugs prescribed as part of  medication-assisted treatment for opioid use disorder should not be used to benchmark against dosage thresholds meant for opioids  prescribed for pain.    Correct pharmacy verified with patient and confirmed in snapshot? [x] yes []no    Charge captured ? [x] yes  [] no    Medications Phoned  to Pharmacy [] yes [x]no  Name of Pharmacist:  List Medications, including dose, quantity and instructions      Medication Prescriptions given to patient   [] yes  [x] no   List the name of the drug the prescription was written for.       Medications ordered this visit were e-scribed.  Verified by order class [x] yes  [] no  Zoloft and Seroquel  Medication changes or discontinuations were communicated to patient's pharmacy: [] yes  [x] no    UA collected [] yes  [x]  no    [place] Prescription Monitoring Program Reviewed? [x] yes  [] no    Referrals were made to:  none    Future appointment was made: [x] yes  [] no  9/11/18  Dictation completed at time of chart check: [x] yes  [] no    I have checked the documentation for today s encounters and the above information has been reviewed and completed.

## 2021-06-16 NOTE — TELEPHONE ENCOUNTER
Dr. Norman's pt.   Last seen : 7/7/20    Left VM asking pt to contact her PCP for refill or to get Magnesium OTC.

## 2021-06-16 NOTE — PROGRESS NOTES
OUTPATIENT PROGRESS NOTE      Date of visit March 13, 2018        Reasons For Visit Are Multiple Today:   1.  Depression, improved overall since Seroquel was started  2.  Anxiety,  On and off some improvement with Seroquel at the beginning, then she stopped it due abdominal GI issues and her concern that may be it caused by Seroquel, and then she started feeling even worse emotionally so she restarted Seroquel after she was put on Nexium and abdominal issues improved overall  3.  Insomnia, improved with Seroquel  4.  Tearfulness, mostly during sensitive moments in the form of emotional incontinence  5.  Multiple hand washing, will not touch the door knob without the napkin, uses multiple Clorox Kleenex during day every day, says that she is embarrassed to visit people or to have visitors over, because of her preoccupation with contamination and need to wash everything thoroughly multiple times , now worsening in the context GI symptoms and non compliance with Seroquel  6.  Multiple pain and health complains, mostly aches and pains, heartburn, heaviness in the stomach, episodic rashes and itching usually self resolving or with OTC, also followed by PMD Dr. Soares and [facility], and resent  return to a culturally sensitive Tajik speaking MD Dr. Zamora who ordered GI work up colonoscopy and endoscopy. She is mostly focused on GI symptoms today and goes into great detail describing it  7.  Suicidal thoughts, resolved  8.  Tajik cultural issues: the interview is in Tajik laanguage  9.  Education on future follow up appointment  10. Questions about medications  11. High risk medication use: Review of potential side effects, neuroleptic consent is signed  12. Ego activities: avoiding friends and activitiesdue to obsessive washing , also limited due to apathy and lack of interest. Going to visit her son in [place] in July 13. High risk medications use: Seroquel, discus test reviewed score 0  14.  Discus test: score 0  normal  15. Renewal of prescription    Subjective:  The patient is [place]or[place] speaking. The interview is conducted in Andorran language, translated personally by me into English records which adds additional element of complexity to this visit and assessment. Complains are as above.   Multiple issues were addressed and multiple questions were answered. Occasionally misperceives the environment and hears somebody talking in the hallway, but there is no one there when she checks on it, none for a while.  She does not want to titrate SSRI for OCD, says she is very comfortable with her med regimen, and does not want to mess with it. Says she only takes Zoloft and Seroquel.     Procedures:  1. Coordination of care: nursing notes, vital signs, multiple records of communication between the patient and the clinic, communication with the pharmacy, and multiple medication orders were reviewed signed and discussed with the patient.  Multiple chart and Epic entries were reviewed in preparation and generation of visit related documentation, multiple visit pertinent chart and EPIC orders were entered.   multiple communication of the patient with the clinic, multiple requests from her apartment management to complete paperwork recommending a quieter apartment.    2.  Education: was provided on importance of compliance, medication side effects, diagnosis, and the importance of ego activities, goal oriented productive activities during day and nurturing activities for treatment of depression. diagnosis and treatment recommendations were explicitly discussed.  Safety issues were also discussed.  Life style modifications and healthy eating habits for weight control.  3.  Counseling: was provided on coping with depression and pain  4.  Coordination of care: I personally coordinated all medication orders, multiple labs and follow up orders, and provided the patient with detailed instructions in her native Andorran and English  languages  7.  Tajik cultural and immigration issues were addressed, as it is common in Tajik speaking community to take Russian produced medications which could be  Dangerous. The patient takes the above medications.  This regimen seems to be safe to combine with Zoloft and Seroquel.  8. Coordination of care: I personally contacted referring provider Dr. Donohue with update on patient's status, records were supplied as well.      Review Of Systems:  As above    Mental Status Examination:   Appearance: The patient is adequately groomed.  Dressed nicely. She is not tearful.  She is much less somaticaly preoccupied compare to previous visits, however remains quite somatically focused. .  Restricted affect.  She is  directable and logical.  No suicidal thoughts.  No thoughts of harming others.  Ideas of reference/paranoia and delusions  Are resolved. She is quite hyperverbal but no other symptoms of margareth, just some disinhibition. No gross abnormalities with STM LTM but subjectively complains that both are impaired. [place] and superficial way of conceptualizing.Speech ruminative. Mood anxious. Affect restricted. Thought process tangential. Associations ruminative. Thought content no SI/HI intense somatic focus.Language normal STM and LTM without gross abnormalities. Psychomotor activity normal. Gait and station stable. Insight and judgement limited.    Limited Physical Examination:  Was performed : Due to complaints of pain, physical discomfort.  The patient is stable on her feet.  Clear skin.  Normal breathing.    Laboratory Data:    personally reviewed.   No visits with results within 2 Month(s) from this visit.  Latest known visit with results is:    Lab on 05/18/2016   Component Date Value     Glucose 05/18/2016 73      Patient Fasting > 8hrs? 05/18/2016 Yes      Hemoglobin A1c 05/18/2016 5.7      Triglycerides 05/18/2016 76      Cholesterol 05/18/2016 224*     LDL Calculated 05/18/2016 133*     HDL Cholesterol 05/18/2016 76       Patient Fasting > 8hrs? 05/18/2016 Yes          Diagnosis:  1.  Major depression, recurrent, moderate, with psychotic features  2.  Insomnia, unspecified  3.  Anxiety disorder NOS  4.  OCD.  5. Somatoform disorder, stable  6. Cognitive disorder NOS - possibly in the context of residual mood symptoms, suboptimally controlled OCD, though possibility of organic causes can not be ruled out    Treatment Plan:  1. The patient was provided with education and detailed instructions in native language on diagnosis and treatment plan.   2. Instructed to return to clinic in 3 - 4 months.  We discussed that she needs regular scheduled frequent appointments with primary physician, nurse, PT, and myself, for treatment of somatoform disorder.  She says that she does not want to come for appointments regularly, because she is very stressed out prior to appointments, anxious, unable to sleep, so appointments  are contra-therapeutic for her in her opinion. So follow up in 6 months  3. Continue PT for exercise and physical strengthening, culturally sensitive [place] speaking [facility] Dr. Donohue  4. Crisis plan in place.  5. Referral to a culturally sensitive Russian speaking therapist Dr. James was provided.  6. Continue   Zoloft for OCD and depression 50 mg every morning, and Seroquel for insomnia, psychosis, and augmentation purposes   7.  Follow up with PMD ; frequently scheduled monthly visit for GI work up and somatoform disorder  8.  Neuropsychiatric testing for cognitive disorder was discussed last visit. She is not sure if she is going to follow up with it, says she wants to postpone all assessments until GI work up is completed.      Total time is 40 minutes, more than 50% of time was coordination of care, multiple issues were addressed, total of 15 issues, education, counseling,  [place]guillan cultural issues, multiple chart and Epic entries were reviewed in preparation and generation of visit related documentation,  multiple visit pertinent chart and EPIC orders were entered,  as fully reflected in the procedures paragraph. Please see associated nursing records for other details.      Megan Norman MD

## 2021-06-20 NOTE — PROGRESS NOTES
Pt is here for culturally sensitive psychiatric med management follow up. Client went to visit her son in [place] and stayed there for a month. She is complaining of worsening of back pain and low energy.  OCD symptoms at baseline but fear of being contaminated affects her social life.     Correct pharmacy verified with patient and confirmed in snapshot? [x] yes []no    Charge captured ? [x] yes  [] no    Medications Phoned  to Pharmacy [] yes [x]no  Name of Pharmacist:  List Medications, including dose, quantity and instructions      Medication Prescriptions given to patient   [] yes  [x] no   List the name of the drug the prescription was written for.       Medications ordered this visit were e-scribed.  Verified by order class [x] yes  [] no   Seroquel and Zoloft  Medication changes or discontinuations were communicated to patient's pharmacy: [x] yes  [] no  Called [facility] and d/c'd Seroquel 50 mg Q HS  UA collected [] yes  [x] no    [place] Prescription Monitoring Program Reviewed? [] yes  [x] no    Referrals were made to:  none    Future appointment was made: [x] yes  [] no  1/3/19  Dictation completed at time of chart check: [x] yes  [] no    I have checked the documentation for today s encounters and the above information has been reviewed and completed.

## 2021-06-20 NOTE — PROGRESS NOTES
OUTPATIENT PROGRESS NOTE      Date of visit September 11, 2018        Reasons For Visit Are Multiple Today:   1.  Depression, improved overall since Seroquel was started, status quo per patient's report  2.  Anxiety,   improvement with Seroquel  , though still has a lot per her report  3.  Insomnia, improved with Seroquel  4.  Tearfulness, mostly during sensitive moments in the form of emotional incontinence, significantly improved  5.  Multiple hand washing, will not touch the door knob without the napkin, uses multiple Chlorox Kleenex during day every day, says that she is embarrassed to visit people or to have visitors over, says that she prefers to be alone as she is embarrassed of her hand washing  because of her preoccupation with contamination and need to wash everything thoroughly multiple times  - stable  6.  Multiple pain and health complains, mostly aches and pains, heartburn, heaviness in the stomach, episodic rashes and itching , bilateral flank discomfort - all issues are chronic, the patient is known to be very somatically focused, she is followed by PMD on a regular basis  7.  Suicidal thoughts, none  8.  Chadian cultural issues: the interview is in Chadian language. The patient takes Noshpa and Allochol to improve gallbladder function - common OTC medications in [place]  9.  Education on future follow up appointment  10. Questions about medications  11. High risk medication use: Review of potential side effects, neuroleptic consent is signed  12. Ego activities: avoiding friends and activitiesdue to obsessive washing , also limited due to apathy and lack of interest. Went to visit her son in [place] in July 13. High risk medications use: Seroquel, discus test reviewed score 0  14.  Discus test: score 0 normal  15. Renewal of prescription    Subjective:  The patient is Chadian speaking. The interview is conducted in Chadian language, translated personally by me into English records which adds  additional element of complexity to this visit and assessment. Complains are as above.   Multiple issues were addressed and multiple questions were answered. Resolved paranoia and ideas of reference. Resolved issues with neighbors. Intense somatic preoccupation. Logorrhea.  She agrees to titrate SSRI for OCD.     Procedures:  1. Coordination of care: nursing notes, vital signs, multiple records of communication between the patient and the clinic, communication with the pharmacy, and multiple medication orders were reviewed signed and discussed with the patient.  Multiple chart and Epic entries were reviewed in preparation and generation of visit related documentation, multiple visit pertinent chart and EPIC orders were entered.   multiple communication of the patient with the clinic, multiple requests from her apartment management to complete paperwork recommending a quieter apartment.    2.  Education: was provided on importance of compliance, medication side effects, diagnosis, and the importance of ego activities, goal oriented productive activities during day and nurturing activities for treatment of depression. diagnosis and treatment recommendations were explicitly discussed.  Safety issues were also discussed.  Life style modifications and healthy eating habits for weight control.  3.  Counseling: was provided on coping with depression and pain  4.  Coordination of care: I personally coordinated all medication orders, multiple labs and follow up orders, and provided the patient with detailed instructions in her native [place]uneian and English  languages  7. [place]uneian cultural and immigration issues were addressed, as it is common in [place]uneian speaking community to take Russian produced medications which could be  Dangerous. The patient takes the above medications.  This regimen seems to be safe to combine with Zoloft and Seroquel.  8. Coordination of care: I personally contacted referring provider Dr. Donohue with update  on patient's status, records were supplied as well.      Review Of Systems:  As above    Mental Status Examination:   Appearance: The patient is adequately groomed.  Dressed nicely. She is not tearful.  She is much very somaticaly preoccupied compare to previous visit.  Restricted affect.  She is  directable and logical.  No suicidal thoughts.  No thoughts of harming others.  Ideas of reference/paranoia and delusions  Are resolved. She is quite hyperverbal as in logorrhea, in the absence of mood symptoms. No gross abnormalities with STM LTM but subjectively complains that both are impaired. [place] and superficial way of conceptualizing.Speech ruminative. Mood anxious. Affect restricted. Thought process tangential. Associations ruminative. Thought content no SI/HI but intense somatic focus.Language normal STM and LTM without gross abnormalities. Psychomotor activity normal. Gait and station stable. Insight and judgement limited.    Limited Physical Examination:  Was performed : Due to complaints of pain, physical discomfort.  The patient is stable on her feet.  Clear skin.  Normal breathing.    Laboratory Data:    personally reviewed.   No visits with results within 2 Month(s) from this visit.  Latest known visit with results is:    Lab on 05/18/2016   Component Date Value     Glucose 05/18/2016 73      Patient Fasting > 8hrs? 05/18/2016 Yes      Hemoglobin A1c 05/18/2016 5.7      Triglycerides 05/18/2016 76      Cholesterol 05/18/2016 224*     LDL Calculated 05/18/2016 133*     HDL Cholesterol 05/18/2016 76      Patient Fasting > 8hrs? 05/18/2016 Yes          Diagnosis:  1.  Major depression, recurrent, moderate, with psychotic features  2.  Insomnia, unspecified  3.  Anxiety disorder NOS  4.  OCD.  5. Somatoform disorder, stable  6. Cognitive disorder NOS - possibly in the context of residual mood symptoms, suboptimally controlled OCD, though possibility of organic causes can not be ruled out    Treatment  Plan:  1. The patient was provided with education and detailed instructions in native language on diagnosis and treatment plan.   2. Instructed to return to clinic in 3 - 4 months.  We discussed that she needs regular scheduled frequent appointments with primary physician, nurse, PT, and myself, for treatment of somatoform disorder.  She says that she does not want to come for appointments regularly, because she is very stressed out prior to appointments, anxious, unable to sleep, so appointments  are contra-therapeutic for her in her opinion. So follow up in 6 months  3. Continue PT for exercise and physical strengthening, culturally sensitive [place] speaking [facility] Dr. Donohue  4. Crisis plan in place.  5. Referral to a culturally sensitive Russian speaking therapist Dr. James was provided.  6. Continue   Zoloft for OCD and depression 50 mg every morning, and Seroquel for insomnia, psychosis, and augmentation purposes - will titrate by 25 mg for the total of 75 mg daily at night for anxiety and intense somatic preoccupation  7.  Follow up with PMD ; frequently scheduled monthly visit for  somatoform disorder  8.  Neuropsychiatric testing for cognitive disorder was discussed last visit. She is not sure if she is going to follow up with it, says she wants to postpone all assessments for now.      Total time is 40 minutes, more than 50% of time was coordination of care, multiple issues were addressed, total of 15 issues, education, counseling,  [place] cultural issues, multiple chart and Epic entries were reviewed in preparation and generation of visit related documentation, multiple visit pertinent chart and EPIC orders were entered,  as fully reflected in the procedures paragraph. Please see associated nursing records for other details.      Megan Norman MD

## 2021-06-23 NOTE — PROGRESS NOTES
OUTPATIENT PROGRESS NOTE      Date of visit January 17, 2019         Reasons For Visit Are Multiple Today:   Culturally sensitive follow-up for mental health issues  1.  Depression, ongoing issues, anhedonia, tearfulness, sad mood  2.  Anxiety, still quite significant  4.  Tearfulness, mostly during sensitive moments in the form of emotional incontinence   5.  Multiple hand washing, will not touch the door knob without the napkin, uses multiple Chlorox Kleenex during day every day, says that she is embarrassed to visit people or to have visitors over, says that she prefers to be alone as she is embarrassed of her hand washing  because of her preoccupation with contamination and need to wash everything thoroughly multiple times  - stable  6.  Multiple pain and health complains, mostly aches and pains, heartburn, heaviness in the stomach, episodic rashes and itching , bilateral flank discomfort - all issues are chronic, the patient is known to be very somatically focused, she is followed by PMD on a regular basis  7.  Suicidal thoughts, none  8.  [place] cultural issues: the interview is in [place] language. The patient takes Noshpa and Allochol to improve gallbladder function - common OTC medications in [place]  9.  Education on future follow up appointment  10. Questions about medications  11. High risk medication use: Review of potential side effects, neuroleptic consent is signed  12. Ego activities: avoiding friends and activitiesdue to obsessive washing , also limited due to apathy and lack of interest.    13. High risk medications use: Seroquel, discus test reviewed score 0  14.  Questionary PHQ9 score 13 GAD7 score 14  15. Renewal of prescription  16. Adjustment of medicatiions      History of present illness/Subjective:  The patient is [place] speaking. The interview is conducted in [place] language, translated personally by me into English records which adds additional element of complexity to this visit and  my assessment. The patient is tangential, with significant somatic focus, all issues are reflected above. She claims to be compliant with medications,  The OCD symptoms are the most prominent at this time. No problem with sleep. No psychosis at this time, resolved since addition of Seroquel.        Medications:      Current Outpatient Medications   Medication Sig Dispense Refill     ACETAMINOPHEN (TYLENOL ORAL) Take by mouth as needed.       esomeprazole (NEXIUM) 40 MG capsule TAKE 1 CAPSULE BY MOUTH DAILY  11     L.acid/L.casei/B.bif/B.oliver/FOS (PROBIOTIC BLEND ORAL) Take by mouth.       lisinopril (PRINIVIL,ZESTRIL) 5 MG tablet Take 5 mg by mouth daily.  3     miscellaneous medical supply Misc 1 Each by Misc.(Non-Drug; Combo Route) route Once Daily. Back support belt       NON FORMULARY No - Spa       UNABLE TO FIND as needed. Maltese remedy:  Allahol  Nosh - Pa       MAGNESIUM OXIDE ORAL Take by mouth bedtime.       No current facility-administered medications for this visit.          Family history/Social history  Lives alone. Unemployed, on SSI.          Procedures:  1. Coordination of care: nursing notes, vital signs, multiple records of communication between the patient and the clinic, communication with the pharmacy, and multiple medication orders were reviewed signed and discussed with the patient. Multiple chart entries were reviewed in preparation of documentation and generation of documentation.  2.  Education: was provided on diagnosis, medication regimen, psychotherapeutic treatment modalities, future follow-up appointments.  3.  Counseling: was provided on coping with mental illness and obsessions.  4.   Coordination of care: I personally contacted referring provider Dr. Donohue with update on patient's status, records were supplied as well.  6.  Coordination of care: I personally coordinated all medication orders, follow up orders, pharmacy requests, and provided the patient with detailed instructions in  "Palestinian  7. Palestinian cultural and immigration issues were addressed, the interview was conducted in Palestinian language, Palestinian medications were discussed as it is common in Palestinian speaking community to take Russian produced medications which could be dangerous. The patient takes Noshpa and Allochol as discussed above       Review Of Systems:  As above.   The reminder of 10 systems was negative.      Vital Signs:    /89 (Patient Site: Right Arm, Patient Position: Sitting, Cuff Size: Adult Large)   Pulse 71   Ht 4' 11\" (1.499 m)   Wt 152 lb (68.9 kg)   BMI 30.70 kg/m      Mental Status Examination:     Appearance  Adequate grroming    Alert and oriented ×3    Attention and concentration  normal    Speech  rambling    Mood  Anxious depressed    Affect tearful    Thought processing  Tangential likely due to anxiety    Associations  concrete    Thought content  No SI, somatic peeoccupation    Language  Normal in Palauan    Short term memory  With deficits    Long term memory  No gross abnormalitie    Fund of knowledge  low    Psychomotor activity  normal    Gait and station  normal    Insight and judgment  preserved           Laboratory Data:     personally reviewed.   No visits with results within 2 Month(s) from this visit.   Latest known visit with results is:   Lab on 05/18/2016   Component Date Value     Glucose 05/18/2016 73      Patient Fasting > 8hrs? 05/18/2016 Yes      Hemoglobin A1c 05/18/2016 5.7      Triglycerides 05/18/2016 76      Cholesterol 05/18/2016 224*     LDL Calculated 05/18/2016 133*     HDL Cholesterol 05/18/2016 76      Patient Fasting > 8hrs? 05/18/2016 Yes          Diagnosis:  No past medical history on file.    Patient Active Problem List   Diagnosis     Major depressive disorder, recurrent episode, severe, specified as with psychotic behavior     Other dysfunctions of sleep stages or arousal from sleep     Anxiety     OCD (obsessive compulsive disorder)     Somatoform disorder     " Neuroleptic consent signed 1/21/16     Visit diagnosis:    Major depression recurrent moderate  OCD, severe  Generalized anxiety disorder  Cognitive disorder NOS - possibly in the context of residual mood symptoms, suboptimally controlled OCD, though possibility of organic causes can not be ruled out  Somatoform disorder    Treatment Plan:  1. The patient was provided with education and detailed written and verbal instructions in native language on diagnosis, future follow-up, and treatment plan, same instructions were also provided in English language.   2. Instructed to return to clinic in 3 - 4 months.  We discussed that she needs regular scheduled frequent appointments with primary physician, nurse, PT, and myself, for treatment of somatoform disorder.  She says that she does not want to come for appointments regularly, because she is very stressed out prior to appointments, anxious, unable to sleep, so appointments  are contra-therapeutic for her in her opinion. So follow up in 6 months  3. Nurse only clinic is recommended for maintenance of somatoform disorder, but she declined  4. Crisis plan in place.  5. Referral to a culturally sensitive Russian speaking therapist Dr. James was provided.  6. Continue PT for exercise and physical strengthening, culturally sensitive [facility] speaking [facility] Dr. Donohue  7. Follow up with PMD ; frequently scheduled monthly visits for  somatoform disorder  8. Titrate Zoloft 100 mg for OCD, anxiety, depression  9. Titrate Seroquel 100 mg for anxiety, OCD and augmentation    Total time today is 40 minutes face to face the patient is quite ruminative and difficult to redirect, she needed time to process her stressors and I allowed her to do so for therapeutic reasons and provide her with supportive therapy and this took extra time, major adjusment of meds, high risk meds use, multiple scale completed and reviewed,,  more than 50% of time was coordination of care,  education, counseling,  German cultural issues, review of pertinent hospital and clinic records and multiple chart and Epic  entries in preparation and generation of pertinent documentation, generation of multiple Epic entries and visit related orders, other details are fully reflected in the procedures paragraph. Please see associated nursing records for other details.         This note was created by undersigned using a Dragon dictation system. All typing errors or contextual distortion are unintentional and software inherent.     Megan Norman MD

## 2021-06-23 NOTE — PROGRESS NOTES
Pt is here for culturally sensitive psychiatric med management follow up. Client refused oral temp (OCD). She is complaining of ongoing abdominal pain, says she eats mostly bread because of pain. She is socially isolated, cleaning a lot, and thinks that her disease slowly worsens    Correct pharmacy verified with patient and confirmed in snapshot? [x] yes []no    Charge captured ? [x] yes  [] no    Medications Phoned  to Pharmacy [] yes [x]no  Name of Pharmacist:  List Medications, including dose, quantity and instructions      Medication Prescriptions given to patient   [] yes  [x] no   List the name of the drug the prescription was written for.       Medications ordered this visit were e-scribed.  Verified by order class [x] yes  [] no   Seroquel 100 and Sertraline 100  Medication changes or discontinuations were communicated to patient's pharmacy: [x] yes  [] no  Phone call made to [facility] and d/c'd Zoloft 50 mg and Seroquel 50 mg  UA collected [] yes  [x] no    [place] Prescription Monitoring Program Reviewed? [] yes  [x] no    Referrals were made to:   none    Future appointment was made: [x] yes  [] no  7/18/19  Dictation completed at time of chart check: [x] yes  [] no    I have checked the documentation for today s encounters and the above information has been reviewed and completed.     over time.     .

## 2021-07-01 ENCOUNTER — APPOINTMENT (OUTPATIENT)
Dept: URBAN - METROPOLITAN AREA CLINIC 259 | Age: 71
Setting detail: DERMATOLOGY
End: 2021-07-01

## 2021-07-01 DIAGNOSIS — L64.8 OTHER ANDROGENIC ALOPECIA: ICD-10-CM

## 2021-07-01 DIAGNOSIS — L20.89 OTHER ATOPIC DERMATITIS: ICD-10-CM

## 2021-07-01 DIAGNOSIS — L82.0 INFLAMED SEBORRHEIC KERATOSIS: ICD-10-CM

## 2021-07-01 PROCEDURE — OTHER MIPS QUALITY: OTHER

## 2021-07-01 PROCEDURE — OTHER COUNSELING: OTHER

## 2021-07-01 PROCEDURE — OTHER PRESCRIPTION: OTHER

## 2021-07-01 PROCEDURE — 99203 OFFICE O/P NEW LOW 30 MIN: CPT | Mod: 25

## 2021-07-01 PROCEDURE — 17110 DESTRUCT B9 LESION 1-14: CPT

## 2021-07-01 PROCEDURE — OTHER LIQUID NITROGEN: OTHER

## 2021-07-01 PROCEDURE — OTHER PRESCRIPTION MEDICATION MANAGEMENT: OTHER

## 2021-07-01 RX ORDER — TRIAMCINOLONE ACETONIDE 1 MG/G
CREAM TOPICAL
Qty: 1 | Refills: 1 | Status: ERX | COMMUNITY
Start: 2021-07-01

## 2021-07-01 RX ORDER — SPIRONOLACTONE 25 MG/1
TABLET, FILM COATED ORAL
Qty: 180 | Refills: 0 | Status: ERX | COMMUNITY
Start: 2021-07-01

## 2021-07-01 ASSESSMENT — LOCATION ZONE DERM
LOCATION ZONE: SCALP
LOCATION ZONE: ARM

## 2021-07-01 ASSESSMENT — LOCATION DETAILED DESCRIPTION DERM
LOCATION DETAILED: LEFT CENTRAL PARIETAL SCALP
LOCATION DETAILED: LEFT PROXIMAL DORSAL FOREARM
LOCATION DETAILED: RIGHT ELBOW

## 2021-07-01 ASSESSMENT — LOCATION SIMPLE DESCRIPTION DERM
LOCATION SIMPLE: RIGHT ELBOW
LOCATION SIMPLE: SCALP
LOCATION SIMPLE: LEFT FOREARM

## 2021-07-01 ASSESSMENT — SEVERITY ASSESSMENT 2020: SEVERITY 2020: MILD

## 2021-07-01 NOTE — HPI: HAIR LOSS
Previous Labs: No
How Did The Hair Loss Occur?: gradual in onset
How Severe Is Your Hair Loss?: moderate
What Hair Products Do You Use?: Moisturizing products

## 2021-07-01 NOTE — PROCEDURE: LIQUID NITROGEN
Include Z78.9 (Other Specified Conditions Influencing Health Status) As An Associated Diagnosis?: No
Render Post-Care Instructions In Note?: yes
Medical Necessity Information: It is in your best interest to select a reason for this procedure from the list below. All of these items fulfill various CMS LCD requirements except the new and changing color options.
Number Of Freeze-Thaw Cycles: 2 freeze-thaw cycles
Duration Of Freeze Thaw-Cycle (Seconds): 5-10
Post-Care Instructions: I reviewed with the patient in detail post-care instructions. Patient is to wear sunprotection, and avoid picking at any of the treated lesions. Pt may apply Vaseline to crusted or scabbing areas.
Detail Level: Detailed
Consent: The patient's consent was obtained including but not limited to risks of crusting, scabbing, blistering, scarring, darker or lighter pigmentary change, recurrence, incomplete removal and infection.
Medical Necessity Clause: This procedure was medically necessary because the lesions that were treated were:

## 2021-07-01 NOTE — PROCEDURE: MIPS QUALITY
Quality 111:Pneumonia Vaccination Status For Older Adults: Pneumococcal Vaccination not Administered or Previously Received, Reason not Otherwise Specified
Detail Level: Detailed
Quality 431: Preventive Care And Screening: Unhealthy Alcohol Use - Screening: Unhealthy alcohol use screening not performed, reason not otherwise specified
Quality 130: Documentation Of Current Medications In The Medical Record: Current Medications Documented
Quality 226: Preventive Care And Screening: Tobacco Use: Screening And Cessation Intervention: Patient screened for tobacco use and is an ex/non-smoker